# Patient Record
Sex: FEMALE | URBAN - METROPOLITAN AREA
[De-identification: names, ages, dates, MRNs, and addresses within clinical notes are randomized per-mention and may not be internally consistent; named-entity substitution may affect disease eponyms.]

---

## 2023-02-08 ENCOUNTER — WEB ENCOUNTER (OUTPATIENT)
Dept: URBAN - METROPOLITAN AREA CLINIC 107 | Facility: CLINIC | Age: 53
End: 2023-02-08

## 2023-02-08 ENCOUNTER — OFFICE VISIT (OUTPATIENT)
Dept: URBAN - METROPOLITAN AREA CLINIC 107 | Facility: CLINIC | Age: 53
End: 2023-02-08
Payer: COMMERCIAL

## 2023-02-08 VITALS
TEMPERATURE: 98 F | DIASTOLIC BLOOD PRESSURE: 74 MMHG | HEIGHT: 65 IN | SYSTOLIC BLOOD PRESSURE: 103 MMHG | BODY MASS INDEX: 40.48 KG/M2 | WEIGHT: 243 LBS | HEART RATE: 72 BPM

## 2023-02-08 DIAGNOSIS — K59.01 SLOW TRANSIT CONSTIPATION: ICD-10-CM

## 2023-02-08 DIAGNOSIS — K57.92 DIVERTICULITIS: ICD-10-CM

## 2023-02-08 PROCEDURE — 99244 OFF/OP CNSLTJ NEW/EST MOD 40: CPT | Performed by: INTERNAL MEDICINE

## 2023-02-08 PROCEDURE — 99204 OFFICE O/P NEW MOD 45 MIN: CPT | Performed by: INTERNAL MEDICINE

## 2023-02-08 RX ORDER — CIPROFLOXACIN 500 MG/1
1 TABLET TABLET, FILM COATED ORAL TWICE A DAY
Qty: 20 TABLET | Refills: 0 | OUTPATIENT
Start: 2023-02-08 | End: 2023-02-18

## 2023-02-08 RX ORDER — POLYETHYLENE GLYCOL 3350, NF POWDER FOR SOLUTION, LAXATIVE 17 G/D
1 SCOOP POWDER, FOR SOLUTION ORAL ONCE A DAY
Qty: 527 GRAMS | Refills: 3 | OUTPATIENT
Start: 2023-02-08 | End: 2023-06-08

## 2023-02-08 RX ORDER — HYOSCYAMINE SULFATE 0.12 MG/1
PLACE 1 TABLET UNDER THE TONGUE AND ALLOW TO DISSOLVE AS NEEDED FOR ABDOMINAL PAIN TABLET SUBLINGUAL
Qty: 45 | Refills: 1 | OUTPATIENT
Start: 2023-02-08 | End: 2023-02-28

## 2023-02-08 RX ORDER — METRONIDAZOLE 375 MG/1
2 CAPSULES CAPSULE ORAL
Status: ACTIVE | COMMUNITY

## 2023-02-08 RX ORDER — POLYETHYLENE GLYCOL 3350, SODIUM CHLORIDE, SODIUM BICARBONATE, POTASSIUM CHLORIDE 420; 11.2; 5.72; 1.48 G/4L; G/4L; G/4L; G/4L
AS DIRECTED POWDER, FOR SOLUTION ORAL
Qty: 420 GRAM | Refills: 0 | OUTPATIENT
Start: 2023-02-08 | End: 2023-02-09

## 2023-02-08 RX ORDER — METRONIDAZOLE 500 MG/1
1 TABLET TABLET ORAL THREE TIMES A DAY
Qty: 30 TABLET | Refills: 0 | OUTPATIENT
Start: 2023-02-08 | End: 2023-02-18

## 2023-02-08 NOTE — HPI-TODAY'S VISIT:
Over the weekend, she experienced the sudden onset of severe mid abdominal pain and nausea. She was seen in the ER two days ago, with CT as outlined below. She was discharged on a 5-day course of Flagyl. Her symptoms are largely unchanged to this point. She complains of diffuse mid abdominal pain which is worsened after eating. She has been following a liquid diet since leaving the ER, but did eat a tuna fish sandwich last night. She has constipation at baseline and has a nonbloody stool 1-2x per week. She has tried various fiber supplements, OTC laxatives, and magnesium in the past without relief. She complains of upper abdominal bloating and fullness following most meals. She denies unintentional weight loss. She has never had a colonoscopy. She underwent a d&c about 3 weeks ago due to endometriosis and uterine polyps. She is in a new relationship and is trying to conceive. She states she has a short window at her age, and may need to consider fertility treatments. She reports a negative urine pregnancy test in the ER prior to the CT.  She provides a CT of the abdomen and pelvis with oral contrast only from 2/6/2023 which shows a moderate mucosal thickening of the proximal sigmoid colon with associated subtle pericolonic edema and a few small otherwise noninflamed diverticula, suspicious for underlying mild mid sigmoid colitis.  Also notable for multiple small nonobstructing left nephrolithiases, fibroid uterus and endometrial stripe.

## 2023-02-08 NOTE — HPI-TODAY'S VISIT:
53yo female referred by Yamile Santiago NP for evaluation of constipation and colon cancer screening.

## 2023-03-08 ENCOUNTER — OFFICE VISIT (OUTPATIENT)
Dept: URBAN - METROPOLITAN AREA CLINIC 107 | Facility: CLINIC | Age: 53
End: 2023-03-08
Payer: COMMERCIAL

## 2023-03-08 ENCOUNTER — DASHBOARD ENCOUNTERS (OUTPATIENT)
Age: 53
End: 2023-03-08

## 2023-03-08 VITALS
HEART RATE: 64 BPM | SYSTOLIC BLOOD PRESSURE: 130 MMHG | TEMPERATURE: 98 F | WEIGHT: 240 LBS | BODY MASS INDEX: 39.99 KG/M2 | HEIGHT: 65 IN | DIASTOLIC BLOOD PRESSURE: 63 MMHG

## 2023-03-08 DIAGNOSIS — R10.12 LEFT UPPER QUADRANT ABDOMINAL PAIN: ICD-10-CM

## 2023-03-08 DIAGNOSIS — R14.0 ABDOMINAL BLOATING: ICD-10-CM

## 2023-03-08 DIAGNOSIS — K57.92 DIVERTICULITIS: ICD-10-CM

## 2023-03-08 DIAGNOSIS — K59.01 SLOW TRANSIT CONSTIPATION: ICD-10-CM

## 2023-03-08 DIAGNOSIS — R10.13 EPIGASTRIC ABDOMINAL PAIN: ICD-10-CM

## 2023-03-08 PROBLEM — 307496006 DIVERTICULITIS: Status: ACTIVE | Noted: 2023-02-08

## 2023-03-08 PROBLEM — 35298007 SLOW TRANSIT CONSTIPATION: Status: ACTIVE | Noted: 2023-02-08

## 2023-03-08 PROCEDURE — 99214 OFFICE O/P EST MOD 30 MIN: CPT | Performed by: NURSE PRACTITIONER

## 2023-03-08 RX ORDER — LACTULOSE 10 G/15ML
60 ML SOLUTION ORAL TWICE DAILY
Qty: 3600 MILLILITER | Refills: 3 | OUTPATIENT
Start: 2023-03-08 | End: 2023-07-06

## 2023-03-08 RX ORDER — HYOSCYAMINE SULFATE 0.12 MG/1
PLACE 1 TABLET UNDER THE TONGUE AND ALLOW TO DISSOLVE AS NEEDED FOR ABDOMINAL PAIN TABLET SUBLINGUAL
Qty: 45 | Refills: 1 | OUTPATIENT
Start: 2023-03-08 | End: 2023-03-28

## 2023-03-08 RX ORDER — POLYETHYLENE GLYCOL 3350, NF POWDER FOR SOLUTION, LAXATIVE 17 G/D
1 SCOOP POWDER, FOR SOLUTION ORAL ONCE A DAY
Qty: 527 GRAMS | Refills: 3 | Status: ON HOLD | COMMUNITY
Start: 2023-02-08 | End: 2023-06-08

## 2023-03-08 RX ORDER — LINACLOTIDE 145 UG/1
1 CAPSULE AT LEAST 30 MINUTES BEFORE THE FIRST MEAL OF THE DAY ON AN EMPTY STOMACH CAPSULE, GELATIN COATED ORAL ONCE A DAY
Qty: 90 | Refills: 3 | OUTPATIENT
Start: 2023-03-08 | End: 2024-03-02

## 2023-03-08 RX ORDER — OMEPRAZOLE 40 MG/1
1 CAPSULE 30 MINUTES BEFORE MORNING MEAL CAPSULE, DELAYED RELEASE ORAL ONCE A DAY
Qty: 90 | Refills: 4 | OUTPATIENT
Start: 2023-03-08

## 2023-03-08 RX ORDER — METRONIDAZOLE 375 MG/1
2 CAPSULES CAPSULE ORAL
Status: ON HOLD | COMMUNITY

## 2023-03-08 NOTE — HPI-TODAY'S VISIT:
She was seen in the office 2/8/2023 for evaluation of a recent exacerbation of abdominal pain with CT findings concerning for sigmoid diverticulitis.  Her treatment regimen was optimized with an extended course of Cipro and metronidazole.  A diagnostic colonoscopy was recommended in 6 to 8 weeks to evaluate for underlying colorectal pathology.  Regarding chronic constipation, she was recommended a daily bowel regimen with MiraLAX and milk of magnesia.  Hyoscyamine was provided to use as needed. She returns today for short-interval follow-up due to persistent symptoms. She complains of constant epigastric and left upper quadrant abdominal pain with bloating which is worsened following meals. She is eating a bland diet, mostly consisting of oatmeal, apple sauce, and yogurt. She continues to struggle with constipation, going days between bowel movements. She states the stools are "like sludge." She reports no response to MiraLAX or milk of magnesia. She has been taking a family member's prescription lactulose, which provides significant relief. The bloating and pressure in her abdomen subside with a good bowel movement. She denies reflux symptoms. No NSAID use. She is concerned regarding potential H pylori. She began weight loss shots today. She did not receive the previous prescription for hyoscyamine so was unable to try the medication. 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

## 2023-03-08 NOTE — HPI-OTHER HISTORIES
She provides a CT of the abdomen and pelvis with oral contrast only from 2/6/2023 which shows a moderate mucosal thickening of the proximal sigmoid colon with associated subtle pericolonic edema and a few small otherwise noninflamed diverticula, suspicious for underlying mild mid sigmoid colitis.  Also notable for multiple small nonobstructing left nephrolithiases, fibroid uterus and endometrial stripe.

## 2023-03-09 ENCOUNTER — TELEPHONE ENCOUNTER (OUTPATIENT)
Dept: URBAN - METROPOLITAN AREA CLINIC 113 | Facility: CLINIC | Age: 53
End: 2023-03-09

## 2023-03-21 ENCOUNTER — TELEPHONE ENCOUNTER (OUTPATIENT)
Dept: URBAN - METROPOLITAN AREA CLINIC 107 | Facility: CLINIC | Age: 53
End: 2023-03-21

## 2023-03-27 ENCOUNTER — OFFICE VISIT (OUTPATIENT)
Dept: URBAN - METROPOLITAN AREA SURGERY CENTER 25 | Facility: SURGERY CENTER | Age: 53
End: 2023-03-27

## 2023-03-27 ENCOUNTER — TELEPHONE ENCOUNTER (OUTPATIENT)
Dept: URBAN - METROPOLITAN AREA CLINIC 113 | Facility: CLINIC | Age: 53
End: 2023-03-27

## 2023-04-24 ENCOUNTER — OFFICE VISIT (OUTPATIENT)
Dept: URBAN - METROPOLITAN AREA CLINIC 107 | Facility: CLINIC | Age: 53
End: 2023-04-24

## 2023-05-10 ENCOUNTER — OFFICE VISIT (OUTPATIENT)
Dept: URBAN - METROPOLITAN AREA CLINIC 107 | Facility: CLINIC | Age: 53
End: 2023-05-10